# Patient Record
Sex: FEMALE | Race: WHITE
[De-identification: names, ages, dates, MRNs, and addresses within clinical notes are randomized per-mention and may not be internally consistent; named-entity substitution may affect disease eponyms.]

---

## 2020-06-12 ENCOUNTER — HOSPITAL ENCOUNTER (OUTPATIENT)
Dept: HOSPITAL 92 - BICMAMMO | Age: 65
Discharge: HOME | End: 2020-06-12
Payer: MEDICARE

## 2020-06-12 DIAGNOSIS — N63.20: ICD-10-CM

## 2020-06-12 DIAGNOSIS — Z12.31: Primary | ICD-10-CM

## 2020-06-12 DIAGNOSIS — Z13.820: ICD-10-CM

## 2020-06-12 DIAGNOSIS — N95.9: ICD-10-CM

## 2020-06-12 PROCEDURE — 77067 SCR MAMMO BI INCL CAD: CPT

## 2020-06-12 PROCEDURE — 77080 DXA BONE DENSITY AXIAL: CPT

## 2020-06-12 PROCEDURE — 77063 BREAST TOMOSYNTHESIS BI: CPT

## 2020-06-12 NOTE — BD
Exam:  DEXA Bone Density

6/12/20

 

HISTORY: 

65-year-old postmenopausal female for screening. 

 

Lumbar Spine:       BMD (g/cm2)      T-SCORE      

    L1              0.975            -0.1                

    L2              0.892            -1.2                

    L3              0.857            -2.1                

    L4              1.033            -0.3                

 

    L1-L4           0.942            -1.0                

 

Left Femoral Neck:  0.785            -0.6                     

 

Total Proximal Femur: 0.990            0.4                       

 

Impression:

Normal bone mineral density.

 

POS: EAA

## 2020-06-12 NOTE — MMO
Bilateral MAMMO Bilat Screen [facility].

 

CLINICAL HISTORY:

Patient is 65 years old and is seen for screening. The patient has no family

history of breast cancer.  The patient has no personal history of cancer.

 

VIEWS:

The views performed were:  bilateral craniocaudal with tomosynthesis and

bilateral mediolateral oblique with tomosynthesis.

 

FILMS COMPARED:

The present examination has been compared to prior imaging studies performed at

[facility] on 08/12/2014 and 04/28/2016, and at [facility] on 03/05/2011 and 07/02/2012.

 

This study has been interpreted with the assistance of computer-aided detection.

 

MAMMOGRAM FINDINGS:

There are scattered fibroglandular densities.

 

There is a mass measuring 5 millimeters seen in the lower-inner region of the

left breast.

 

This has increased in size from the prior exam.

 

In the right breast, there are no suspicious masses, calcifications or areas of

architectural distortion.

 

IMPRESSION:

MASS IN THE LEFT BREAST REQUIRES ADDITIONAL EVALUATION. AN ULTRASOUND EXAM IS

RECOMMENDED. ADDITIONAL IMAGING.

 

THE RESULTS OF THIS EXAM WERE SENT TO THE PATIENT.

 

ACR BI-RADS Category 0 - Incomplete:  Need additional imaging evaluation. [facility] will notify the patient of the need for additional imaging services.

 

MAMMOGRAPHY NOTE:

 1. A negative mammogram report should not delay a biopsy if a dominant of

 clinically suspicious mass is present.

 2. Approximately 10% to 15% of breast cancers are not detected by

 mammography.

 3. Adenosis and dense breasts may obscure an underlying neoplasm.

 

 

Reported by: JOLIE SAGE MD

Electonically Signed: [id number]

## 2020-06-17 ENCOUNTER — HOSPITAL ENCOUNTER (OUTPATIENT)
Dept: HOSPITAL 92 - BICULT | Age: 65
Discharge: HOME | End: 2020-06-17
Payer: MEDICARE

## 2020-06-17 DIAGNOSIS — N63.10: Primary | ICD-10-CM

## 2020-06-17 PROCEDURE — 76642 ULTRASOUND BREAST LIMITED: CPT

## 2020-06-17 PROCEDURE — G0279 TOMOSYNTHESIS, MAMMO: HCPCS

## 2020-06-17 PROCEDURE — 77065 DX MAMMO INCL CAD UNI: CPT

## 2020-06-17 NOTE — ULT
LEFT BREAST ULTRASOUND:

6/17/20

 

HISTORY: 

Abnormal mammogram of 6/12/20.

 

FINDINGS: 

Correlation is made with mammograms of 6/12/20 and today. 

 

Sonographic evaluation of the left lower inner breast demonstrates no abnormality corresponding to th
e finding on the mammogram.

 

IMPRESSION: 

BI-RADS 4: Suspicious Abnormality - Biopsy Should Be Considered 

  Usually requires biopsy.

 

Needle localization and biopsy is recommended. 

 

Discussed in person with the patient at 3:55 p.m.

## 2020-06-17 NOTE — MMO
Left Breast MAMMO Unilat Diag [facility]DI LT+[facility]SA.

 

CLINICAL HISTORY:

Patient is 65 years old and is seen for diagnostic exam.

 

VIEWS:

The views performed were:  .

 

FILMS COMPARED:

The present examination has been compared to prior imaging studies performed at

[facility] on 08/12/2014, 04/28/2016, 06/12/2020 and 06/17/2020.

 

This study has been interpreted with the assistance of computer-aided detection.

 

MAMMOGRAM FINDINGS:

There are scattered fibroglandular densities.

 

Additional views were performed.  The small nodular density persists but is not

sen on US.

 

IMPRESSION:

FINDING IN THE LEFT BREAST IS SUSPICIOUS. NEEDLE LOCALIZATION AND BIOPSY ARE

RECOMMENDED.

 

THE RESULTS OF THIS EXAM WERE SENT TO THE PATIENT.

 

ACR BI-RADS Category 4 - Suspicious abnormality - biopsy should be considered

 

D/W pt in person @ 3:55 pm

 

MAMMOGRAPHY NOTE:

 1. A negative mammogram report should not delay a biopsy if a dominant of

 clinically suspicious mass is present.

 2. Approximately 10% to 15% of breast cancers are not detected by

 mammography.

 3. Adenosis and dense breasts may obscure an underlying neoplasm.

 

 

Reported by: CHRISTINA GAGE MD

Electonically Signed: [id number]

## 2020-07-09 ENCOUNTER — HOSPITAL ENCOUNTER (OUTPATIENT)
Dept: HOSPITAL 92 - LABBT | Age: 65
Discharge: HOME | End: 2020-07-09
Attending: SPECIALIST
Payer: MEDICARE

## 2020-07-09 DIAGNOSIS — Z01.812: Primary | ICD-10-CM

## 2020-07-09 DIAGNOSIS — N63.20: ICD-10-CM

## 2020-07-09 DIAGNOSIS — Z11.59: ICD-10-CM

## 2020-07-09 LAB
ANION GAP SERPL CALC-SCNC: 13 MMOL/L (ref 10–20)
BASOPHILS # BLD AUTO: 0 THOU/UL (ref 0–0.2)
BASOPHILS NFR BLD AUTO: 0.4 % (ref 0–1)
BUN SERPL-MCNC: 9 MG/DL (ref 9.8–20.1)
CALCIUM SERPL-MCNC: 9.5 MG/DL (ref 7.8–10.44)
CHLORIDE SERPL-SCNC: 105 MMOL/L (ref 98–107)
CO2 SERPL-SCNC: 26 MMOL/L (ref 23–31)
CREAT CL PREDICTED SERPL C-G-VRATE: 0 ML/MIN (ref 70–130)
EOSINOPHIL # BLD AUTO: 0.2 THOU/UL (ref 0–0.7)
EOSINOPHIL NFR BLD AUTO: 2.5 % (ref 0–10)
GLUCOSE SERPL-MCNC: 101 MG/DL (ref 80–115)
HGB BLD-MCNC: 12 G/DL (ref 12–16)
LYMPHOCYTES # BLD: 3.1 THOU/UL (ref 1.2–3.4)
LYMPHOCYTES NFR BLD AUTO: 43 % (ref 21–51)
MCH RBC QN AUTO: 29.3 PG (ref 27–31)
MCV RBC AUTO: 90.9 FL (ref 78–98)
MONOCYTES # BLD AUTO: 0.6 THOU/UL (ref 0.11–0.59)
MONOCYTES NFR BLD AUTO: 7.8 % (ref 0–10)
NEUTROPHILS # BLD AUTO: 3.3 THOU/UL (ref 1.4–6.5)
NEUTROPHILS NFR BLD AUTO: 46.3 % (ref 42–75)
PLATELET # BLD AUTO: 343 THOU/UL (ref 130–400)
POTASSIUM SERPL-SCNC: 4.5 MMOL/L (ref 3.5–5.1)
RBC # BLD AUTO: 4.11 MILL/UL (ref 4.2–5.4)
SODIUM SERPL-SCNC: 139 MMOL/L (ref 136–145)
WBC # BLD AUTO: 7.1 THOU/UL (ref 4.8–10.8)

## 2020-07-09 PROCEDURE — 85025 COMPLETE CBC W/AUTO DIFF WBC: CPT

## 2020-07-09 PROCEDURE — 87635 SARS-COV-2 COVID-19 AMP PRB: CPT

## 2020-07-09 PROCEDURE — U0003 INFECTIOUS AGENT DETECTION BY NUCLEIC ACID (DNA OR RNA); SEVERE ACUTE RESPIRATORY SYNDROME CORONAVIRUS 2 (SARS-COV-2) (CORONAVIRUS DISEASE [COVID-19]), AMPLIFIED PROBE TECHNIQUE, MAKING USE OF HIGH THROUGHPUT TECHNOLOGIES AS DESCRIBED BY CMS-2020-01-R: HCPCS

## 2020-07-09 PROCEDURE — 80048 BASIC METABOLIC PNL TOTAL CA: CPT

## 2021-06-22 ENCOUNTER — HOSPITAL ENCOUNTER (OUTPATIENT)
Dept: HOSPITAL 92 - BICMAMMO | Age: 66
Discharge: HOME | End: 2021-06-22
Payer: MEDICARE

## 2021-06-22 DIAGNOSIS — Z12.31: Primary | ICD-10-CM

## 2021-06-22 PROCEDURE — 77067 SCR MAMMO BI INCL CAD: CPT

## 2021-06-22 PROCEDURE — 77063 BREAST TOMOSYNTHESIS BI: CPT

## 2023-02-15 ENCOUNTER — HOSPITAL ENCOUNTER (OUTPATIENT)
Dept: HOSPITAL 92 - BICMAMMO | Age: 68
Discharge: HOME | End: 2023-02-15
Payer: MEDICARE

## 2023-02-15 DIAGNOSIS — Z12.31: Primary | ICD-10-CM

## 2023-02-15 PROCEDURE — 77067 SCR MAMMO BI INCL CAD: CPT

## 2023-02-15 PROCEDURE — 77063 BREAST TOMOSYNTHESIS BI: CPT
